# Patient Record
Sex: FEMALE | Race: WHITE | NOT HISPANIC OR LATINO | Employment: FULL TIME
[De-identification: names, ages, dates, MRNs, and addresses within clinical notes are randomized per-mention and may not be internally consistent; named-entity substitution may affect disease eponyms.]

---

## 2017-10-15 ENCOUNTER — HEALTH MAINTENANCE LETTER (OUTPATIENT)
Age: 42
End: 2017-10-15

## 2021-09-08 ENCOUNTER — OFFICE VISIT (OUTPATIENT)
Dept: URGENT CARE | Facility: URGENT CARE | Age: 46
End: 2021-09-08
Payer: COMMERCIAL

## 2021-09-08 VITALS
WEIGHT: 157.6 LBS | OXYGEN SATURATION: 99 % | HEART RATE: 75 BPM | DIASTOLIC BLOOD PRESSURE: 75 MMHG | TEMPERATURE: 99.6 F | SYSTOLIC BLOOD PRESSURE: 125 MMHG

## 2021-09-08 DIAGNOSIS — B02.29 OTHER POSTHERPETIC NERVOUS SYSTEM INVOLVEMENT: ICD-10-CM

## 2021-09-08 DIAGNOSIS — M54.6 ACUTE LEFT-SIDED THORACIC BACK PAIN: Primary | ICD-10-CM

## 2021-09-08 PROCEDURE — 99204 OFFICE O/P NEW MOD 45 MIN: CPT | Performed by: INTERNAL MEDICINE

## 2021-09-08 RX ORDER — LEVOTHYROXINE SODIUM 100 UG/1
100 TABLET ORAL DAILY
COMMUNITY
Start: 2021-08-17

## 2021-09-08 RX ORDER — GABAPENTIN 100 MG/1
CAPSULE ORAL
Qty: 216 CAPSULE | Refills: 0 | Status: SHIPPED | OUTPATIENT
Start: 2021-09-08 | End: 2021-10-11

## 2021-09-08 RX ORDER — ESTRADIOL AND NORETHINDRONE ACETATE .5; .1 MG/1; MG/1
1 TABLET ORAL
COMMUNITY
Start: 2021-08-30

## 2021-09-08 ASSESSMENT — PAIN SCALES - GENERAL: PAINLEVEL: SEVERE PAIN (7)

## 2021-09-08 NOTE — PROGRESS NOTES
SUBJECTIVE:  Kathleen Gandhi is an 46 year old female who presents for pain in left mid back.  Is now radiating around to side. Has had sxs for about 2.5 -3 weeks and has worsened during that time and now radiating to side some.  No injuries or strains she's aware.  Has put some biofreeze on it and had  massage it which hasn't helped much.  Certain movements, like moving left arm across body or when sits against chair.  No fevers, chills, sweats.  No skin rashes or bruising.  No n/v/d.  No abdominal pain.  No change in sxs when eats.  Hasn't had pain like this before. As pt talks about pain, there is a fairly narrow band of the thoracic left band where the pain is located and it radiates in this band around to the side and under the left breast.  The pain is different than other kind of back or muscle pain she has had, and she states it is hard to describe it.  The pain is triggered by light to medium touch more than deep pressure, and once the area has been touched it tends to hurt for a while before it settles back down.    PMH:   hypothyroidism; post-menopausal    Social History     Socioeconomic History     Marital status:      Spouse name: None     Number of children: None     Years of education: None     Highest education level: None   Occupational History     None   Tobacco Use     Smoking status: Never Smoker     Smokeless tobacco: Never Used   Substance and Sexual Activity     Alcohol use: None     Drug use: None     Sexual activity: None   Other Topics Concern     None   Social History Narrative     None     Social Determinants of Health     Financial Resource Strain:      Difficulty of Paying Living Expenses:    Food Insecurity:      Worried About Running Out of Food in the Last Year:      Ran Out of Food in the Last Year:    Transportation Needs:      Lack of Transportation (Medical):      Lack of Transportation (Non-Medical):    Physical Activity:      Days of Exercise per Week:       Minutes of Exercise per Session:    Stress:      Feeling of Stress :    Social Connections:      Frequency of Communication with Friends and Family:      Frequency of Social Gatherings with Friends and Family:      Attends [employer] Services:      Active Member of Clubs or Organizations:      Attends Club or Organization Meetings:      Marital Status:    Intimate Partner Violence:      Fear of Current or Ex-Partner:      Emotionally Abused:      Physically Abused:      Sexually Abused:      No family history on file.    ALLERGIES:  Patient has no known allergies.    Current Outpatient Medications   Medication     estradiol-norethindrone (AMABELZ) 0.5-0.1 MG tablet     levothyroxine (SYNTHROID/LEVOTHROID) 100 MCG tablet     No current facility-administered medications for this visit.         ROS:  ROS is done and is negative for general/constitutional, eye, ENT, Respiratory, cardiovascular, GI, , Skin, musculoskeletal except as noted elsewhere.  All other review of systems negative except as noted elsewhere.      OBJECTIVE:  /75   Pulse 75   Temp 99.6  F (37.6  C) (Tympanic)   Wt 71.5 kg (157 lb 9.6 oz)   SpO2 99%   GENERAL APPEARANCE: Alert, in no acute distress  EYES: normal  NOSE:normal  OROPHARYNX:normal  NECK:No adenopathy,masses or thyromegaly  RESP: normal and clear to auscultation  CV:regular rate and rhythm and no murmurs, clicks, or gallops  ABDOMEN: Abdomen soft, non-tender. BS normal. No masses, organomegaly  SKIN: no ulcers, lesions or rash  MUSCULOSKELETAL: mild tenderness with touch of a band from left throracic back along the side and under the breast, tracking in a T6 or T7 dermatomal pattern.  No muscle spasm noted.  No tenderness of back or side to the right of midline.    NEURO:  Strength 5/5 and symmetric in bilateral upper  extremities.  DTRs 2+ and symmetric in bilateral upper extremities.  Sensation to light touch grossly intact in bilateral upper extremities.    RESULTS  No  results found for any visits on 09/08/21..  No results found for this or any previous visit (from the past 48 hour(s)).    ASSESSMENT/PLAN:    ASSESSMENT / PLAN:  (M54.6) Acute left-sided thoracic back pain  (primary encounter diagnosis)  Comment: currently her sxs are most c/w herpes zoster even though there no skin lesions at this time.  The dermatomal distribution of her pain along with her description of the quality of pain is c/w herpetic or post-herpetic pain  Plan: gabapentin (NEURONTIN) 100 MG capsule        Reviewed medication instructions and side effects. Follow up if experiences side effects.. I reviewed supportive care, otc meds to use if needed, expected course, and signs of concern.  Follow up as needed or if she does not improve within 10 day(s) or if worsens in any way.  Reviewed red flag symptoms and is to go to the ER if experiences any of these.    (B02.29) Other postherpetic nervous system involvement  Comment: sxs c/w herpetic or post-herpetic neuralgia.  Will tx with gabapentin, taper up as needed  Plan: gabapentin (NEURONTIN) 100 MG capsule        Reviewed medication instructions and side effects. Follow up if experiences side effects.. I reviewed supportive care, otc meds to use if needed, expected course, and signs of concern.  Follow up as needed or if she does not improve within 10 day(s) or if worsens in any way.  Reviewed red flag symptoms and is to go to the ER if experiences any of these.      PPE worn: mask and shield.    See [facility] for orders, medications, letters, patient instructions    Alexsandra Lunsford M.D.

## 2021-10-08 DIAGNOSIS — B02.29 OTHER POSTHERPETIC NERVOUS SYSTEM INVOLVEMENT: ICD-10-CM

## 2021-10-08 DIAGNOSIS — M54.6 ACUTE LEFT-SIDED THORACIC BACK PAIN: ICD-10-CM

## 2021-10-11 RX ORDER — GABAPENTIN 300 MG/1
300 CAPSULE ORAL 3 TIMES DAILY
Qty: 90 CAPSULE | Refills: 0 | Status: SHIPPED | OUTPATIENT
Start: 2021-10-11 | End: 2021-10-14

## 2021-10-11 NOTE — TELEPHONE ENCOUNTER
Routing refill request to provider for review/approval because:  Drug not on the [facility] refill protocol     Courtney GRIDERN, RN

## 2021-10-12 NOTE — TELEPHONE ENCOUNTER
Chart reviewed.  Rx sent to pt's preferred pharmacy.  Needs to establish primary care(probably in [place]) before further refills can be given.    [id number] - [address] AT [address]O Mitchell MD

## 2021-10-12 NOTE — TELEPHONE ENCOUNTER
Attempt #1, unable to reach patient via phone number on file nor unable to send message via [employer].    Dontrell Johnson